# Patient Record
Sex: MALE | Race: WHITE
[De-identification: names, ages, dates, MRNs, and addresses within clinical notes are randomized per-mention and may not be internally consistent; named-entity substitution may affect disease eponyms.]

---

## 2018-09-11 ENCOUNTER — HOSPITAL ENCOUNTER (EMERGENCY)
Dept: HOSPITAL 31 - C.ER | Age: 28
Discharge: HOME | End: 2018-09-11
Payer: MEDICAID

## 2018-09-11 VITALS — SYSTOLIC BLOOD PRESSURE: 124 MMHG | DIASTOLIC BLOOD PRESSURE: 75 MMHG | RESPIRATION RATE: 16 BRPM | HEART RATE: 84 BPM

## 2018-09-11 VITALS — OXYGEN SATURATION: 97 %

## 2018-09-11 VITALS — TEMPERATURE: 98.2 F

## 2018-09-11 DIAGNOSIS — L02.412: Primary | ICD-10-CM

## 2018-09-11 NOTE — C.PDOC
History Of Present Illness


27 y/o male presents to ED with c/o swelling to left axilla for 4 days. Patient 

states he tried to incise area himself 2 days ago without relief. Patient has 

history of abscess in past with 1 I&D. Patient denies fever, chills, nausea, 

vomiting, chest pain, sob, or any other complaints at this time.   (Liliana Rossi)


History Per: Patient


History/Exam Limitations: no limitations


Onset/Duration Of Symptoms: Days


Current Symptoms Are (Timing): Still Present


Time Seen by Provider: 09/11/18 17:39


Chief Complaint (Nursing): Abnormal Skin Integrity





Past Medical History


Reviewed: Historical Data, Nursing Documentation, Vital Signs





- Medical History


PMH: Anxiety, Bipolar Disorder


Surgical History: No Surg Hx


Family History: States: No Known Family Hx





- Social History


Hx Tobacco Use: Yes


Hx Alcohol Use: No


Hx Substance Use: Yes (heroin 1 week relapse)





- Immunization History


Hx Tetanus Toxoid Vaccination: No


Hx Influenza Vaccination: No


Hx Pneumococcal Vaccination: No


Vital Signs: 





 Last Vital Signs











Temp  98.2 F   09/11/18 17:19


 


Pulse  84   09/11/18 18:43


 


Resp  16   09/11/18 18:43


 


BP  124/75   09/11/18 18:43


 


Pulse Ox  97   09/11/18 22:49














- [facility] Procedures











DRUG DETOXIFICATION (07/16/15)











Review Of Systems


Constitutional: Negative for: Fever, Chills


Cardiovascular: Negative for: Chest Pain


Respiratory: Negative for: Shortness of Breath


Gastrointestinal: Negative for: Nausea, Vomiting


Skin: Positive for: Other (abscess).  Negative for: Rash





Physical Exam





- Physical Exam


Appears: Non-toxic, No Acute Distress


Skin: Warm, Dry, Other (3cm area of erythema, swelling and tenderness with 

central fluctuance to left axilla)


Head: Atraumatic, Normacephalic


Eye(s): bilateral: Normal Inspection, EOMI


Nose: Normal


Oral Mucosa: Moist


Neck: Supple


Chest: Symmetrical


Cardiovascular: Rhythm Regular


Respiratory: Normal Breath Sounds, No Rales, No Rhonchi, No Wheezing


Neurological/Psych: Oriented x3, Normal Speech, Normal Cognition





ED Course And Treatment


O2 Sat by Pulse Oximetry: 97 (RA)


Pulse Ox Interpretation: Normal


Progress Note: DIscussed wound care and wound check in 2 days.





- Incision & Drainage Of Abscess


Anesthesia: Lidocaine 1%, With Epi


Prep Used: Sterile Water, Betadine


Procedure: Incised W/Scalpel Blade#: (11), Drained Pus, Irrigated Cavity W/

Saline, Probed To Break Up Loculations, Packed W/Gauze, Cultures Obtained And 

Sent To Lab





Disposition





- Disposition


Disposition Time: 18:31





- Disposition


Disposition: HOME/ ROUTINE


Condition: STABLE


Additional Instructions: 


Wound check in 2 days. 


Prescriptions: 


Cephalexin [cephalexin] 500 mg PO BID #14 cap


Sulfamethoxazole/Trimethoprim [Bactrim  mg-160 mg] 1 tab PO BID #14 tab


Instructions:  Abscess Incision and Drainage (DC)


Forms:  [facility] (English)





- Clinical Impression


Clinical Impression: 


 Incisional abscess








- PA / NP / Resident Statement


MD/DO has reviewed & agrees with the documentation as recorded.





- Scribe Statement


The provider has reviewed the documentation as recorded by the Scribe





- Scribe Statement


Harpreet Bellamy





All medical record entries made by the Scribe were at my direction and 

personally dictated by me. I have reviewed the chart and agree that the record 

accurately reflects my personal performance of the history, physical exam, 

medical decision making, and the department course for this patient. I have 

also personally directed, reviewed, and agree with the discharge instructions 

and disposition. (Liliana Rossi)





Addendum


Addendum: 





09/13/18 11:21


received lab call for MRSA, senstive to bactrim. pt was d/c home with bactrim.  

(Roberto Calhoun)

## 2019-02-15 ENCOUNTER — HOSPITAL ENCOUNTER (EMERGENCY)
Dept: HOSPITAL 31 - C.ER | Age: 29
Discharge: HOME | End: 2019-02-15
Payer: COMMERCIAL

## 2019-02-15 VITALS
DIASTOLIC BLOOD PRESSURE: 83 MMHG | SYSTOLIC BLOOD PRESSURE: 127 MMHG | HEART RATE: 97 BPM | RESPIRATION RATE: 18 BRPM | TEMPERATURE: 98.9 F

## 2019-02-15 VITALS — OXYGEN SATURATION: 98 %

## 2019-02-15 DIAGNOSIS — F11.10: Primary | ICD-10-CM

## 2019-02-15 LAB
BACTERIA #/AREA URNS HPF: (no result) /[HPF]
BILIRUB UR-MCNC: (no result) MG/DL
CAOX CRY #/AREA URNS HPF: (no result) /HPF
GLUCOSE UR STRIP-MCNC: NORMAL MG/DL
LEUKOCYTE ESTERASE UR-ACNC: (no result) LEU/UL
PH UR STRIP: 5 [PH] (ref 5–8)
PROT UR STRIP-MCNC: (no result) MG/DL
RBC # UR STRIP: NEGATIVE /UL
SP GR UR STRIP: 1.03 (ref 1–1.03)
SQUAMOUS EPITHIAL: 1 /HPF (ref 0–5)
UROBILINOGEN UR-MCNC: 4 MG/DL (ref 0.2–1)

## 2019-02-15 NOTE — C.PDOC
History Of Present Illness


29 y/o M presents requesting detox for opioid abuse. Patient reports abdominal 

cramps from withdrawal. 


Time Seen by Provider: 02/15/19 20:16


Chief Complaint (Nursing): Abdominal Pain





Past Medical History


Vital Signs: 





                                Last Vital Signs











Temp  98.3 F   02/15/19 19:42


 


Pulse  102 H  02/15/19 19:42


 


Resp  20   02/15/19 19:42


 


BP  135/83   02/15/19 19:42


 


Pulse Ox  98   02/15/19 19:42














- Medical History


PMH: Anxiety, Bipolar Disorder, Depression


   Denies: Diabetes, Hepatitis, HIV, HTN, Paranoia, Post Traumatic Stress 

Disorder, Schizophrenia, Seizures, Sexually Transmitted Disease





- [facility] Procedures











DRUG DETOXIFICATION (07/16/15)








Family History: States: Unknown Family Hx





- Social History


Hx Tobacco Use: Yes


Hx Alcohol Use: No


Hx Substance Use: Yes (heroine)





- Immunization History


Hx Tetanus Toxoid Vaccination: No


Hx Influenza Vaccination: No


Hx Pneumococcal Vaccination: No





Review Of Systems


Except As Marked, All Systems Reviewed And Found Negative.


Constitutional: Negative for: Fever


Respiratory: Negative for: Shortness of Breath





Physical Exam





- Physical Exam


Appears: No Acute Distress


Skin: Normal Color


Head: Normacephalic


Oral Mucosa: Moist


Respiratory: No Accessory Muscle Use


Extremity: No Deformity


Neurological/Psych: Normal Speech





ED Course And Treatment


O2 Sat by Pulse Oximetry: 98





Medical Decision Making


Medical Decision Making: 


Patient recently discharged from another detox program so therefore not 

candidate for detox at [facility] within 30 days.





Disposition





- Disposition


Disposition: HOME/ ROUTINE


Disposition Time: 20:36


Condition: STABLE


Instructions:  Opioid Use Disorder


Forms:  [facility] (English)





- Clinical Impression


Clinical Impression: 


 Opioid abuse

## 2019-03-12 ENCOUNTER — HOSPITAL ENCOUNTER (INPATIENT)
Dept: HOSPITAL 31 - C.ER | Age: 29
LOS: 2 days | Discharge: HOME | DRG: 745 | End: 2019-03-14
Attending: PSYCHIATRY & NEUROLOGY | Admitting: PSYCHIATRY & NEUROLOGY
Payer: COMMERCIAL

## 2019-03-12 DIAGNOSIS — F32.9: ICD-10-CM

## 2019-03-12 DIAGNOSIS — F41.9: ICD-10-CM

## 2019-03-12 DIAGNOSIS — F14.20: ICD-10-CM

## 2019-03-12 DIAGNOSIS — F11.23: Primary | ICD-10-CM

## 2019-03-12 DIAGNOSIS — F17.210: ICD-10-CM

## 2019-03-12 DIAGNOSIS — F31.9: ICD-10-CM

## 2019-03-12 LAB
ALBUMIN SERPL-MCNC: 4.4 G/DL (ref 3.5–5)
ALBUMIN/GLOB SERPL: 1.5 {RATIO} (ref 1–2.1)
ALT SERPL-CCNC: 346 U/L (ref 21–72)
AST SERPL-CCNC: 109 U/L (ref 17–59)
BACTERIA #/AREA URNS HPF: (no result) /[HPF]
BASOPHILS # BLD AUTO: 0.1 K/UL (ref 0–0.2)
BASOPHILS NFR BLD: 1.1 % (ref 0–2)
BILIRUB UR-MCNC: NEGATIVE MG/DL
BUN SERPL-MCNC: 14 MG/DL (ref 9–20)
CALCIUM SERPL-MCNC: 8.9 MG/DL (ref 8.6–10.4)
EOSINOPHIL # BLD AUTO: 0.1 K/UL (ref 0–0.7)
EOSINOPHIL NFR BLD: 2 % (ref 0–4)
ERYTHROCYTE [DISTWIDTH] IN BLOOD BY AUTOMATED COUNT: 15.2 % (ref 11.5–14.5)
GFR NON-AFRICAN AMERICAN: > 60
GLUCOSE UR STRIP-MCNC: NORMAL MG/DL
HGB BLD-MCNC: 11.3 G/DL (ref 12–18)
LEUKOCYTE ESTERASE UR-ACNC: (no result) LEU/UL
LYMPHOCYTES # BLD AUTO: 2.5 K/UL (ref 1–4.3)
LYMPHOCYTES NFR BLD AUTO: 46.5 % (ref 20–40)
MCH RBC QN AUTO: 25.5 PG (ref 27–31)
MCHC RBC AUTO-ENTMCNC: 31.8 G/DL (ref 33–37)
MCV RBC AUTO: 80.3 FL (ref 80–94)
MONOCYTES # BLD: 0.6 K/UL (ref 0–0.8)
MONOCYTES NFR BLD: 11.4 % (ref 0–10)
NEUTROPHILS # BLD: 2.1 K/UL (ref 1.8–7)
NEUTROPHILS NFR BLD AUTO: 39 % (ref 50–75)
NRBC BLD AUTO-RTO: 0.1 % (ref 0–2)
PH UR STRIP: 5 [PH] (ref 5–8)
PLATELET # BLD: 301 K/UL (ref 130–400)
PMV BLD AUTO: 8 FL (ref 7.2–11.7)
PROT UR STRIP-MCNC: NEGATIVE MG/DL
RBC # BLD AUTO: 4.42 MIL/UL (ref 4.4–5.9)
RBC # UR STRIP: (no result) /UL
SP GR UR STRIP: 1.02 (ref 1–1.03)
UROBILINOGEN UR-MCNC: 2 MG/DL (ref 0.2–1)
WBC # BLD AUTO: 5.4 K/UL (ref 4.8–10.8)

## 2019-03-12 PROCEDURE — HZ2ZZZZ DETOXIFICATION SERVICES FOR SUBSTANCE ABUSE TREATMENT: ICD-10-PCS | Performed by: PSYCHIATRY & NEUROLOGY

## 2019-03-12 PROCEDURE — HZ56ZZZ INDIVIDUAL PSYCHOTHERAPY FOR SUBSTANCE ABUSE TREATMENT, PSYCHOEDUCATION: ICD-10-PCS | Performed by: PSYCHIATRY & NEUROLOGY

## 2019-03-12 PROCEDURE — HZ59ZZZ INDIVIDUAL PSYCHOTHERAPY FOR SUBSTANCE ABUSE TREATMENT, SUPPORTIVE: ICD-10-PCS | Performed by: PSYCHIATRY & NEUROLOGY

## 2019-03-12 PROCEDURE — HZ52ZZZ INDIVIDUAL PSYCHOTHERAPY FOR SUBSTANCE ABUSE TREATMENT, COGNITIVE-BEHAVIORAL: ICD-10-PCS | Performed by: PSYCHIATRY & NEUROLOGY

## 2019-03-12 PROCEDURE — GZ58ZZZ INDIVIDUAL PSYCHOTHERAPY, COGNITIVE-BEHAVIORAL: ICD-10-PCS | Performed by: PSYCHIATRY & NEUROLOGY

## 2019-03-12 PROCEDURE — GZ56ZZZ INDIVIDUAL PSYCHOTHERAPY, SUPPORTIVE: ICD-10-PCS | Performed by: PSYCHIATRY & NEUROLOGY

## 2019-03-12 NOTE — C.PDOC
History Of Present Illness


27 y/o male with a PMHx of substance abuse presents today requesting detox from 

heroin and cocaine. Admits he last used today at 3:00am. Patient also admits to 

drinking alcohol yesterday. Otherwise he denies any suicidal or homicidal 

ideation. Denies any physical complaints. 





Time Seen by Provider: 03/12/19 16:46


Chief Complaint (Nursing): Substance Abuse


History Per: Patient


History/Exam Limitations: no limitations


Onset/Duration Of Symptoms: Days


Current Symptoms Are (Timing): Still Present


Modifying Factor(s): Alcohol, Cocaine, Other (Heroin)


Associated Symptoms: denies: Suicidal Thoughts, Suicidal Plan





Past Medical History


Reviewed: Historical Data, Nursing Documentation, Vital Signs


Vital Signs: 





                                Last Vital Signs











Temp  98.2 F   03/12/19 15:22


 


Pulse  61   03/12/19 15:22


 


Resp  20   03/12/19 15:22


 


BP  116/61   03/12/19 15:22


 


Pulse Ox  99   03/12/19 15:22














- Medical History


PMH: Anxiety, Bipolar Disorder, Depression


   Denies: Diabetes, Hepatitis, HIV, HTN, Paranoia, Post Traumatic Stress 

Disorder, Schizophrenia, Seizures, Sexually Transmitted Disease





- [facility] Procedures











DRUG DETOXIFICATION (07/16/15)








Family History: States: Unknown Family Hx





- Social History


Hx Tobacco Use: Yes


Hx Alcohol Use: No


Hx Substance Use: Yes (heroine)





- Immunization History


Hx Tetanus Toxoid Vaccination: No


Hx Influenza Vaccination: No


Hx Pneumococcal Vaccination: No





Review Of Systems


Except As Marked, All Systems Reviewed And Found Negative.


Constitutional: Negative for: Fever, Chills


Cardiovascular: Negative for: Palpitations


Respiratory: Negative for: Shortness of Breath


Gastrointestinal: Negative for: Vomiting, Abdominal Pain


Neurological: Negative for: Weakness, Dizziness


Psych: Positive for: Withdrawal.  Negative for: Suicidal ideation





Physical Exam





- Physical Exam


Appears: Non-toxic, No Acute Distress, Other (Calm, Cooperative, Appears 

comfortable)


Skin: Warm, Dry, No Rash


Head: Atraumatic, Normacephalic


Eye(s): bilateral: Normal Inspection, PERRL, EOMI


Oral Mucosa: Moist


Neck: Normal ROM


Chest: Symmetrical


Cardiovascular: Rhythm Regular, No Murmur


Respiratory: Normal Breath Sounds, No Accessory Muscle Use, Other (NARD)


Gastrointestinal/Abdominal: Soft, No Tenderness, No Distention


Extremity: Bilateral: Atraumatic, Normal ROM


Neurological/Psych: Oriented x3, Normal Speech, Other (No acute intox or 

withdrawal)





ED Course And Treatment





- Laboratory Results


Result Diagrams: 


                                 03/12/19 17:41





                                 03/12/19 17:41


O2 Sat by Pulse Oximetry: 99 (RA)


Pulse Ox Interpretation: Normal





Progress





- Re-Evaluation


Re-evaluation Note: 





03/12/19 18:24


MED CLEAR FOR DETOX EVAL. CRISIS NOTIFIED





- Data Reviewed


Data Reviewed: Lab, Old records





Medical Decision Making


Medical Decision Making: 





Plan:


Labs ordered for medical clearance.


Crisis worker notified and will evaluate patient for detox. 





Disposition


Counseled Patient/Family Regarding: Studies Performed, Diagnosis





- Disposition


Disposition: HOSPITALIZED


Disposition Time: 18:33


Condition: STABLE


Forms:  [facility] (English)





- POA


Present On Arrival: None





- Clinical Impression


Clinical Impression: 


 Opiate abuse, continuous








- Scribe Statement


The provider has reviewed the documentation as recorded by the Scribe


Lilo Gore





Provider Attestation: 


All medical record entries made by the Scribe were at my direction and 

personally dictated by me. I have reviewed the chart and agree that the record 

accurately reflects my personal performance of the history, physical exam, 

medical decision making, and the department course for this patient. I have also

personally directed, reviewed, and agree with the discharge instructions and 

disposition.

## 2019-03-12 NOTE — PCM.BM
<Keith Bella - Last Filed: 03/12/19 18:58>





Treatment Plan Problems





- Problems identified on initial assessmt


  ** denial


Date Initiated: 03/12/19


Time Initiated: 18:56


Assessment reference: NA


Status: Active





  ** low motivation to change


Date Initiated: 03/12/19


Time Initiated: 18:58


Assessment reference: NA


Status: Active





  ** defensive coping


Date Initiated: 03/12/19


Time Initiated: 18:58


Assessment reference: NA


Status: Active





Treatment assets and liabiliti


Patient Assests: adapts well, cooperative, self-reliant, ADL independent, 

cognitively intact


Patient Liabilities: substance abuse





- Milieu Protocol


Maintain good personal hygiene: daily Encourage regular showers, daily Remind 

patient to perform daily oral care, daily Assist patient to perform ADL's


Conduct patient checks and document Observation sheet: Q15 minutes


Maintain personal safety: every shift Educate patient to report safety concerns 

to staff, every shift Monitor environment for contraband/sharps


Medication safety: Monitor for expected outcome, potential side effects: every 

shift, Assess barriers to learning: every shift, Assess readiness for medication

education: every shift





<Dora De La Rosa - Last Filed: 03/13/19 12:08>





Family Contact


Family involvement: No known Family/SO





- Goals for Treatment


Patient goals for treatment: Complete detox and apply for short-term rehab.





Discharge/Continuing Care





- Education Needs


Education Needs: Patient Medication, Patient Diagnosis/Disease Process, Patient 

Coping Skills, Patient Anger Management skills, Patient Placement options, 

Patient Community resources





- Discharge


Discharge Criteria: No longer exhibiting s/s of withdrawal, Reduction of target 

symptoms


Discharge to:: Substance Abuse Rehab





- Treatment Team Participation


Patient/Family/SO Statement: 





03/13/19 12:08


"I wanna go to short-term after here..."


Discussed with Family/SO: No


Was Patient/Family/SO present at Treatment Team Meeting: Yes

## 2019-03-13 NOTE — PCM.PSYCH
Initial Psychiatric Evaluation





- Initial Psychiatric Evaluation


Type of Admission: Voluntary


Legal Status: Capacity


History of Present Illness and Precipitating Events: 





Patient is a 27yo male who lives in a rooming house, is single, and is 

unemployed. 


He is here for cessation of heroin use. Patient's last use was 20 bags at 3am on

3/12/19. He typically shoots 20-30 bags daily and has been using heroin for he 

last 6 years. He has overdosed multiple times; the last time was "a few months 

ago". Patient also uses 1 bag of cocaine daily. Patient admits to months 

consumption of 1 pint off rum and on those same occasions smoking 102 

cigarettes.


Patient states he feels tired, sick, and anxious, but he does not elaborate on 

withdrawal symptoms. He is interested in methadone.


Patient denies hallucinations and paranoia. Patient also denies depression 

symptoms of changes in sleep, anhedonia, guilt, loss of concentration, changes 

in appetite).


Patient was admitted to [facility] last month. This is his 

second time to a detox unit, and he has never been to rehab. [email or web address] in the past and is interested in an IOP. He detoxed 2 months ago 

for work. Patient reports naltrexone working for him 6 months ago, but then he 

was arrested and discontinued treatment.


Psychiatric hx: depression, anxiety


PMH: denies


PSH: denies


Family hx: denies


Meds: none


Current Medications: 





Active Medications











Generic Name Dose Route Start Last Admin





  Trade Name Freq  PRN Reason Stop Dose Admin


 


Acetaminophen  650 mg  03/12/19 22:28  





  Tylenol 325mg Tab  PO   





  Q4H PRN   





  Fever greater than 101 F   





     





     





     


 


Al Hydrox/Mg Hydrox/Simethicone  30 ml  03/12/19 22:28  





  Maalox 30 Ml  PO   





  TID PRN   





  Indigestion / Heartburn   





     





     





     


 


Clonidine HCl  0.1 mg  03/12/19 22:28  





  Catapres  PO   





  Q4 PRN   





  COWS Score More or Equal to 5   





     





     





     


 


Dicyclomine HCl  10 mg  03/12/19 22:29  





  Bentyl  PO   





  Q6 PRN   





  Muscle spasm   





     





     





     


 


Hydroxyzine HCl  10 mg  03/12/19 22:29  





  Atarax  PO   





  Q6H PRN   





  Anxiety   





     





     





     


 


Ibuprofen  600 mg  03/12/19 22:28  





  Motrin Tab  PO   





  Q6 PRN   





  Pain, moderate (4-7)   





     





     





     


 


Loperamide HCl  2 mg  03/12/19 22:29  





  Imodium  PO   





  Q8 PRN   





  Diarrhea   





     





     





     


 


Ondansetron HCl  4 mg  03/12/19 22:28  





  Zofran Tab  PO   





  Q8 PRN   





  Nausea/Vomiting   





     





     





     


 


Trazodone HCl  50 mg  03/12/19 22:29  





  Desyrel  PO   





  HS PRN   





  Insomnia   





     





     





     














Past Psychiatric History





- Past Psychiatric History


Pertinent Medical Hx (Current Medical&Sleep Prob, Allergies): 





                                    Allergies











Allergy/AdvReac Type Severity Reaction Status Date / Time


 


cheese Allergy Intermediate RASH Verified 03/12/19 15:21








                                        





No Known Home Med  02/15/19

## 2019-03-14 VITALS — RESPIRATION RATE: 18 BRPM

## 2019-03-14 VITALS
DIASTOLIC BLOOD PRESSURE: 66 MMHG | OXYGEN SATURATION: 98 % | TEMPERATURE: 98 F | SYSTOLIC BLOOD PRESSURE: 105 MMHG | HEART RATE: 75 BPM

## 2019-03-14 NOTE — PCM.PYCHPN
Psychiatric Progress Note





- Psychiatric Progress Note


Medication Change: Yes


Medical Record Reviewed: Yes

## 2019-03-15 NOTE — PCM.PYCHDC
Mental Status Examination





- Mental Status Examination


Orientation: Person





Discharge Summary





- Discharge Note


Consultations:: List each consultation separately and include:  1. Reason for 

request.  2. Findings.  3. Follow-up


Summary of Hospital Course include:: 1. Description of specific treatment plan 

utilized for patients during their course of treatmen.  2. Summarize the time-

course for resolution of acute symptoms and/or regressed behaviors.  3. Describe

issues identified and worked on during hospitalization.  4. Describe medication 

utilized.  5. Describe medical problems identified and treated.  6. Reassessment

of suicide risk


Summary of Hospital Course: 





Patient is a 29yo male who lives in a rooming house, is single, and is 

unemployed. 


He is here for cessation of heroin use. Patient's last use was 20 bags at 3am on

3/12/19. He typically shoots 20-30 bags daily and has been using heroin for he 

last 6 years. He has overdosed multiple times; the last time was "a few months 

ago". Patient also uses 1 bag of cocaine daily. Patient admits to months 

consumption of 1 pint off rum and on those same occasions smoking 102 

cigarettes.


Patient states he feels tired, sick, and anxious, but he does not elaborate on 

withdrawal symptoms. He is interested in methadone.


Patient denies hallucinations and paranoia. Patient also denies depression 

symptoms of changes in sleep, anhedonia, guilt, loss of concentration, changes 

in appetite).


Patient was admitted to [facility] last month. This is his 

second time to a detox unit, and he has never been to rehab. Patient has been to

[facility] in the past and is interested in an IOP. He detoxed 2 months ago 

for work. Patient reports naltrexone working for him 6 months ago, but then he 

was arrested and discontinued treatment.


Psychiatric hx: depression, anxiety


PMH: denies


PSH: denies


Family hx: denies


Meds: none





- Final Diagnosis (DSM 5)


Condition upon Discharge: STABLE


Disposition: HOME/ ROUTINE